# Patient Record
Sex: MALE | Race: WHITE | NOT HISPANIC OR LATINO | Employment: OTHER | URBAN - METROPOLITAN AREA
[De-identification: names, ages, dates, MRNs, and addresses within clinical notes are randomized per-mention and may not be internally consistent; named-entity substitution may affect disease eponyms.]

---

## 2017-03-05 ENCOUNTER — PATIENT OUTREACH (OUTPATIENT)
Dept: HEALTH INFORMATION MANAGEMENT | Facility: OTHER | Age: 77
End: 2017-03-05

## 2017-03-05 NOTE — PROGRESS NOTES
Attempt #:1     Annual Wellness Visit Scheduling  1. Scheduling Status:Not Scheduled. Patient states they are not interested             MyChart Activation:NA

## 2017-04-18 NOTE — PROGRESS NOTES
Attempt #:3    Verify PCP: yes    Communication Preference Obtained: yes     Review Care Team: yes    Annual Wellness Visit Scheduling  1. Scheduling Status:Scheduled          Care Gap Scheduling (Attempt to Schedule EACH Overdue Care Gap!)     Health Maintenance Due   Topic Date Due   • Annual Wellness Visit  1940   • IMM DTaP/Tdap/Td Vaccine (1 - Tdap) 03/05/1960   • IMM ZOSTER VACCINE  03/05/2001         T[employer] Activation: declined  T[employer] Me Stories Lilian: no  Virtual Visits: no  Opt In to Text Messages: no

## 2017-06-01 PROBLEM — E78.5 LIPIDEMIA: Status: ACTIVE | Noted: 2017-06-01

## 2017-06-01 PROBLEM — N40.0 BPH (BENIGN PROSTATIC HYPERPLASIA): Status: ACTIVE | Noted: 2017-06-01

## 2017-09-20 PROBLEM — R97.20 ELEVATED PSA: Status: ACTIVE | Noted: 2017-09-20

## 2018-01-05 PROBLEM — R41.3 MEMORY DISORDER: Status: ACTIVE | Noted: 2018-01-05

## 2019-02-26 PROBLEM — R45.4 IRRITABILITY: Status: ACTIVE | Noted: 2019-02-26

## 2019-02-27 PROBLEM — R73.03 PREDIABETES: Status: ACTIVE | Noted: 2019-02-27

## 2019-09-12 PROBLEM — F03.90 DEMENTIA WITHOUT BEHAVIORAL DISTURBANCE (HCC): Status: ACTIVE | Noted: 2019-09-12

## 2020-06-11 PROBLEM — M19.031 PRIMARY OSTEOARTHRITIS OF RIGHT WRIST: Status: ACTIVE | Noted: 2020-06-11

## 2020-06-11 PROBLEM — F03.918 DEMENTIA WITH BEHAVIORAL DISTURBANCE (HCC): Status: ACTIVE | Noted: 2019-09-12

## 2021-03-26 PROBLEM — R15.9 INCONTINENCE OF FECES: Status: ACTIVE | Noted: 2021-03-26

## 2022-06-06 PROBLEM — E87.6 HYPOKALEMIA: Status: ACTIVE | Noted: 2022-06-06

## 2022-06-06 PROBLEM — N17.9 ACUTE KIDNEY INJURY SUPERIMPOSED ON CKD (HCC): Status: ACTIVE | Noted: 2022-06-06

## 2022-06-06 PROBLEM — E87.20 LACTIC ACIDOSIS: Status: ACTIVE | Noted: 2022-06-06

## 2022-06-06 PROBLEM — Z66 DNR (DO NOT RESUSCITATE): Status: ACTIVE | Noted: 2022-06-06

## 2022-06-06 PROBLEM — E86.0 ACUTE DEHYDRATION: Status: ACTIVE | Noted: 2022-06-06

## 2022-06-06 PROBLEM — N18.31 STAGE 3A CHRONIC KIDNEY DISEASE (HCC): Status: ACTIVE | Noted: 2022-06-06

## 2022-06-06 PROBLEM — R00.1 SINUS BRADYCARDIA: Status: ACTIVE | Noted: 2022-06-06

## 2022-06-06 PROBLEM — N18.9 ACUTE KIDNEY INJURY SUPERIMPOSED ON CKD (HCC): Status: ACTIVE | Noted: 2022-06-06

## 2022-06-06 PROBLEM — D72.829 LEUKOCYTOSIS: Status: ACTIVE | Noted: 2022-06-06

## 2022-06-06 PROBLEM — R19.7 DIARRHEA OF PRESUMED INFECTIOUS ORIGIN: Status: ACTIVE | Noted: 2022-06-06

## 2023-04-26 PROBLEM — E86.0 ACUTE DEHYDRATION: Status: RESOLVED | Noted: 2022-06-06 | Resolved: 2023-04-26

## 2023-04-26 PROBLEM — F34.1 DYSTHYMIA: Status: ACTIVE | Noted: 2023-04-26

## 2023-04-26 PROBLEM — E87.20 LACTIC ACIDOSIS: Status: RESOLVED | Noted: 2022-06-06 | Resolved: 2023-04-26

## 2023-04-26 PROBLEM — F32.9 MAJOR DEPRESSIVE DISORDER, SINGLE EPISODE, UNSPECIFIED: Status: ACTIVE | Noted: 2023-04-26

## 2023-04-26 PROBLEM — N18.9 ACUTE KIDNEY INJURY SUPERIMPOSED ON CKD (HCC): Status: RESOLVED | Noted: 2022-06-06 | Resolved: 2023-04-26

## 2023-04-26 PROBLEM — R41.3 MEMORY DISORDER: Status: RESOLVED | Noted: 2018-01-05 | Resolved: 2023-04-26

## 2023-04-26 PROBLEM — N17.9 ACUTE KIDNEY INJURY SUPERIMPOSED ON CKD (HCC): Status: RESOLVED | Noted: 2022-06-06 | Resolved: 2023-04-26

## 2023-04-26 PROBLEM — E78.00 PURE HYPERCHOLESTEROLEMIA: Status: ACTIVE | Noted: 2023-04-26

## 2023-05-24 PROBLEM — J96.10 CHRONIC RESPIRATORY FAILURE (HCC): Status: ACTIVE | Noted: 2023-05-24
